# Patient Record
Sex: MALE | Race: WHITE | NOT HISPANIC OR LATINO | Employment: PART TIME | URBAN - METROPOLITAN AREA
[De-identification: names, ages, dates, MRNs, and addresses within clinical notes are randomized per-mention and may not be internally consistent; named-entity substitution may affect disease eponyms.]

---
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
## 2023-06-22 VITALS
SYSTOLIC BLOOD PRESSURE: 143 MMHG | HEART RATE: 80 BPM | HEIGHT: 71 IN | DIASTOLIC BLOOD PRESSURE: 88 MMHG | BODY MASS INDEX: 22.68 KG/M2 | WEIGHT: 162 LBS

## 2023-06-22 DIAGNOSIS — S60.551A FOREIGN BODY, HAND, SUPERFICIAL, RIGHT, INITIAL ENCOUNTER: Primary | ICD-10-CM

## 2023-06-22 PROCEDURE — 99203 OFFICE O/P NEW LOW 30 MIN: CPT | Performed by: SURGERY

## 2023-06-22 NOTE — PROGRESS NOTES
ORTHOPAEDIC HAND, WRIST, AND ELBOW OFFICE  VISIT       ASSESSMENT/PLAN:      21 y o male who presents with potential foreign body in Right hand    Plain films independently reviewed:  No clear foreign body identified  Physical exam performed  Discussed that foreign bodies are usually left alone  Noted mass is most likely from the trauma of trying to have the object removed the past week  This will likely improve over time  Discussed if symptoms worsen he may return  He may cover with a band aid     The patient verbalized understanding of exam findings and treatment plan  We engaged in the shared decision-making process and treatment options were discussed at length with the patient  Surgical and conservative management discussed today along with risks and benefits  Diagnoses and all orders for this visit:    Foreign body, hand, superficial, right, initial encounter    Other orders  -     mupirocin (BACTROBAN) 2 % ointment; APPLY 1 APPLICATION EXTERNALLY TWICE A DAY FOR 10 DAYS        Follow Up:  Return if symptoms worsen or fail to improve           ____________________________________________________________________________________________________________________________________________      CHIEF COMPLAINT:  Chief Complaint   Patient presents with   • Right Wrist - Pain       SUBJECTIVE:  Simona Bloom is a 21y o  year old  male who presents for evaluation of foreign objection in Right basal thumb      Pt states approx 3 weeks ago he was working around the house when he got a splinter into his thumb  He was seen in Urgent care   Pt was seen 2x this past 2 weeks where the PCP tried to remove the splinter  Pt states there has been some drainage  No antibiotics were given   Has an antibiotic cream He denies any pain  No drainage today    I have personally reviewed all the relevant PMH, PSH, SH, FH, Medications and allergies      PAST MEDICAL HISTORY:  Past Medical History:   Diagnosis Date   • High blood "pressure        PAST SURGICAL HISTORY:  History reviewed  No pertinent surgical history  FAMILY HISTORY:  History reviewed  No pertinent family history  SOCIAL HISTORY:  Social History     Tobacco Use   • Smoking status: Never   • Smokeless tobacco: Never   Substance Use Topics   • Alcohol use: Never   • Drug use: Never       MEDICATIONS:    Current Outpatient Medications:   •  amLODIPine (NORVASC) 10 mg tablet, Take 10 mg by mouth daily, Disp: , Rfl:   •  Multiple Vitamins-Minerals (multivitamin with minerals) tablet, Take 1 tablet by mouth daily, Disp: , Rfl:   •  mupirocin (BACTROBAN) 2 % ointment, APPLY 1 APPLICATION EXTERNALLY TWICE A DAY FOR 10 DAYS, Disp: , Rfl:   •  predniSONE 10 mg tablet, PLEASE SEE ATTACHED FOR DETAILED DIRECTIONS (Patient not taking: Reported on 6/22/2023), Disp: , Rfl:     ALLERGIES:  No Known Allergies        REVIEW OF SYSTEMS:  Review of Systems   Constitutional: Negative for chills and fever  HENT: Negative for ear pain and sore throat  Eyes: Negative for pain and visual disturbance  Respiratory: Negative for cough and shortness of breath  Cardiovascular: Negative for chest pain and palpitations  Gastrointestinal: Negative for abdominal pain and vomiting  Genitourinary: Negative for dysuria and hematuria  Musculoskeletal: Negative for arthralgias and back pain  Skin: Positive for wound  Negative for color change and rash  Neurological: Negative for seizures and syncope  All other systems reviewed and are negative        VITALS:  Vitals:    06/22/23 0820   BP: 143/88   Pulse: 80       LABS:  HgA1c: No results found for: \"HGBA1C\"  BMP: No results found for: \"GLUCOSE\", \"CALCIUM\", \"NA\", \"K\", \"CO2\", \"CL\", \"BUN\", \"CREATININE\"    _____________________________________________________  PHYSICAL EXAMINATION:  General: well developed and well nourished, alert, oriented times 3 and appears comfortable  Psychiatric: Normal  HEENT: Normocephalic, Atraumatic Trachea " Midline, No torticollis  Pulmonary: No audible wheezing or respiratory distress   Abdomen/GI: Non tender, non distended   Cardiovascular: No pitting edema, 2+ radial pulse   Skin: No masses, erythema, lacerations, fluctation, ulcerations, Mass Right basal thumb, No Erythema, No Lacerations, No Fluctuation, No Ulcerations  Neurovascular: Sensation Intact to the Median, Ulnar, Radial Nerve, Motor Intact to the Median, Ulnar, Radial Nerve and Pulses Intact  Musculoskeletal: Normal, except as noted in detailed exam and in HPI        MUSCULOSKELETAL EXAMINATION:  Small nodule over proximal thenar eminence, no erythema, minimal tenderness on palpation, SILT, able to make a full fist complex  ___________________________________________________  STUDIES REVIEWED:  I have personally reviewed AP lateral and oblique radiographs of Right hand  which demonstrate     No foreign body noted           PROCEDURES PERFORMED:  Procedures  No Procedures performed today    _____________________________________________________      Semaj Mehta    I,:  Lucía Adamson am acting as a scribe while in the presence of the attending physician :       I,:  Ada Bowen MD personally performed the services described in this documentation    as scribed in my presence :

## 2023-12-26 ENCOUNTER — HOSPITAL ENCOUNTER (OUTPATIENT)
Dept: HOSPITAL 99 - RPT | Age: 18
Discharge: HOME | End: 2023-12-26
Payer: COMMERCIAL

## 2023-12-26 DIAGNOSIS — M25.511: ICD-10-CM

## 2023-12-26 DIAGNOSIS — M25.611: Primary | ICD-10-CM

## 2024-01-02 ENCOUNTER — HOSPITAL ENCOUNTER (OUTPATIENT)
Dept: HOSPITAL 99 - RPT | Age: 19
LOS: 22 days | Discharge: HOME | End: 2024-01-24
Payer: COMMERCIAL

## 2024-01-02 DIAGNOSIS — M25.511: ICD-10-CM

## 2024-01-02 DIAGNOSIS — M25.611: Primary | ICD-10-CM

## 2024-01-02 DIAGNOSIS — M62.81: ICD-10-CM

## 2024-01-02 DIAGNOSIS — Z73.6: ICD-10-CM

## 2024-02-20 ENCOUNTER — HOSPITAL ENCOUNTER (OUTPATIENT)
Dept: HOSPITAL 99 - MRI 3T | Age: 19
End: 2024-02-20
Payer: COMMERCIAL

## 2024-02-20 DIAGNOSIS — M25.611: Primary | ICD-10-CM

## 2024-04-29 ENCOUNTER — HOSPITAL ENCOUNTER (OUTPATIENT)
Dept: HOSPITAL 99 - RPT | Age: 19
Discharge: HOME | End: 2024-04-29
Payer: COMMERCIAL

## 2024-04-29 DIAGNOSIS — R20.0: ICD-10-CM

## 2024-04-29 DIAGNOSIS — M75.41: Primary | ICD-10-CM

## 2024-04-29 DIAGNOSIS — Z73.6: ICD-10-CM

## 2024-04-29 DIAGNOSIS — R20.2: ICD-10-CM

## 2024-04-29 DIAGNOSIS — M25.611: ICD-10-CM

## 2024-04-29 DIAGNOSIS — M62.81: ICD-10-CM

## 2024-05-20 ENCOUNTER — HOSPITAL ENCOUNTER (OUTPATIENT)
Dept: HOSPITAL 99 - RPT | Age: 19
Discharge: HOME | End: 2024-05-20
Payer: COMMERCIAL

## 2024-05-20 DIAGNOSIS — M25.611: ICD-10-CM

## 2024-05-20 DIAGNOSIS — M75.41: Primary | ICD-10-CM

## 2024-05-20 DIAGNOSIS — Z73.6: ICD-10-CM

## 2024-10-10 ENCOUNTER — HOSPITAL ENCOUNTER (OUTPATIENT)
Dept: HOSPITAL 99 - RAD | Age: 19
End: 2024-10-10
Payer: COMMERCIAL

## 2024-10-10 DIAGNOSIS — M25.532: Primary | ICD-10-CM
